# Patient Record
Sex: MALE | URBAN - METROPOLITAN AREA
[De-identification: names, ages, dates, MRNs, and addresses within clinical notes are randomized per-mention and may not be internally consistent; named-entity substitution may affect disease eponyms.]

---

## 2023-03-10 ENCOUNTER — HOSPITAL ENCOUNTER (OUTPATIENT)
Facility: MEDICAL CENTER | Age: 58
End: 2023-03-10
Attending: STUDENT IN AN ORGANIZED HEALTH CARE EDUCATION/TRAINING PROGRAM
Payer: COMMERCIAL

## 2023-03-10 LAB
APPEARANCE UR: CLEAR
BILIRUB UR QL STRIP.AUTO: NEGATIVE
COLOR UR: YELLOW
GLUCOSE UR STRIP.AUTO-MCNC: NEGATIVE MG/DL
KETONES UR STRIP.AUTO-MCNC: NEGATIVE MG/DL
LEUKOCYTE ESTERASE UR QL STRIP.AUTO: NEGATIVE
MICRO URNS: NORMAL
NITRITE UR QL STRIP.AUTO: NEGATIVE
PH UR STRIP.AUTO: 6 [PH] (ref 5–8)
PROT UR QL STRIP: NEGATIVE MG/DL
RBC UR QL AUTO: NEGATIVE
SP GR UR STRIP.AUTO: 1.02
UROBILINOGEN UR STRIP.AUTO-MCNC: 0.2 MG/DL

## 2023-03-10 PROCEDURE — 81003 URINALYSIS AUTO W/O SCOPE: CPT

## 2024-12-10 PROBLEM — M17.12 OSTEOARTHRITIS OF LEFT KNEE: Status: ACTIVE | Noted: 2024-12-10

## 2025-01-13 ENCOUNTER — APPOINTMENT (OUTPATIENT)
Dept: ADMISSIONS | Facility: MEDICAL CENTER | Age: 60
End: 2025-01-13
Attending: ORTHOPAEDIC SURGERY
Payer: COMMERCIAL

## 2025-01-17 ENCOUNTER — PRE-ADMISSION TESTING (OUTPATIENT)
Dept: ADMISSIONS | Facility: MEDICAL CENTER | Age: 60
End: 2025-01-17
Attending: ORTHOPAEDIC SURGERY
Payer: COMMERCIAL

## 2025-01-17 VITALS — HEIGHT: 74 IN | WEIGHT: 258.16 LBS | BODY MASS INDEX: 33.13 KG/M2

## 2025-01-17 DIAGNOSIS — Z01.810 PRE-OPERATIVE CARDIOVASCULAR EXAMINATION: ICD-10-CM

## 2025-01-17 DIAGNOSIS — Z01.812 PRE-OPERATIVE LABORATORY EXAMINATION: ICD-10-CM

## 2025-01-17 LAB
ALBUMIN SERPL BCP-MCNC: 4.5 G/DL (ref 3.2–4.9)
ALBUMIN/GLOB SERPL: 1.6 G/DL
ALP SERPL-CCNC: 72 U/L (ref 30–99)
ALT SERPL-CCNC: 37 U/L (ref 2–50)
ANION GAP SERPL CALC-SCNC: 14 MMOL/L (ref 7–16)
AST SERPL-CCNC: 28 U/L (ref 12–45)
BILIRUB SERPL-MCNC: 0.4 MG/DL (ref 0.1–1.5)
BUN SERPL-MCNC: 13 MG/DL (ref 8–22)
CALCIUM ALBUM COR SERPL-MCNC: 8.8 MG/DL (ref 8.5–10.5)
CALCIUM SERPL-MCNC: 9.2 MG/DL (ref 8.4–10.2)
CHLORIDE SERPL-SCNC: 101 MMOL/L (ref 96–112)
CO2 SERPL-SCNC: 22 MMOL/L (ref 20–33)
CREAT SERPL-MCNC: 0.76 MG/DL (ref 0.5–1.4)
EKG IMPRESSION: NORMAL
GFR SERPLBLD CREATININE-BSD FMLA CKD-EPI: 104 ML/MIN/1.73 M 2
GLOBULIN SER CALC-MCNC: 2.9 G/DL (ref 1.9–3.5)
GLUCOSE SERPL-MCNC: 116 MG/DL (ref 65–99)
POTASSIUM SERPL-SCNC: 4.5 MMOL/L (ref 3.6–5.5)
PROT SERPL-MCNC: 7.4 G/DL (ref 6–8.2)
SODIUM SERPL-SCNC: 137 MMOL/L (ref 135–145)

## 2025-01-17 PROCEDURE — 93010 ELECTROCARDIOGRAM REPORT: CPT | Performed by: INTERNAL MEDICINE

## 2025-01-17 PROCEDURE — 80053 COMPREHEN METABOLIC PANEL: CPT

## 2025-01-17 PROCEDURE — 93005 ELECTROCARDIOGRAM TRACING: CPT | Mod: TC

## 2025-01-17 PROCEDURE — 36415 COLL VENOUS BLD VENIPUNCTURE: CPT

## 2025-01-17 RX ORDER — ATORVASTATIN CALCIUM 80 MG/1
80 TABLET, FILM COATED ORAL EVERY EVENING
COMMUNITY
Start: 2024-10-23

## 2025-01-17 RX ORDER — OMEPRAZOLE 20 MG/1
20 CAPSULE, DELAYED RELEASE ORAL DAILY
COMMUNITY

## 2025-01-17 RX ORDER — LISINOPRIL 40 MG/1
40 TABLET ORAL DAILY
COMMUNITY
Start: 2024-12-31

## 2025-01-17 RX ORDER — HYDROCHLOROTHIAZIDE 25 MG/1
25 TABLET ORAL DAILY
COMMUNITY
Start: 2024-12-31

## 2025-01-17 RX ORDER — ACETAMINOPHEN 500 MG
500-1000 TABLET ORAL EVERY 6 HOURS PRN
COMMUNITY

## 2025-01-17 RX ORDER — AMLODIPINE BESYLATE 10 MG/1
10 TABLET ORAL DAILY
COMMUNITY
Start: 2024-12-31

## 2025-01-17 NOTE — PREADMIT AVS NOTE
Current Medications   Medication Instructions    amLODIPine (NORVASC) 10 MG Tab Continue taking medication as prescribed, including morning of procedure     atorvastatin (LIPITOR) 80 MG tablet Continue    lisinopril (PRINIVIL) 40 MG tablet Stop 24 hours before surgery    hydroCHLOROthiazide 25 MG Tab Hold medication day of procedure    acetaminophen (TYLENOL) 500 MG Tab As needed medication, may take if needed, including morning of procedure     omeprazole (PRILOSEC) 20 MG delayed-release capsule Continue taking medication as prescribed, including morning of procedure     Multiple Vitamins-Minerals (CENTRUM SILVER 50+MEN PO) Stop 7 days before surgery    Glucosamine HCl (GLUCOSAMINE PO) Stop 7 days before surgery

## 2025-01-17 NOTE — DISCHARGE PLANNING
DISCHARGE PLANNING NOTE - TOTAL JOINT    Procedure: Procedure(s):  LEFT TOTAL KNEE ARTHROPLASTY  Procedure Date: 1/28/2025  Insurance: Payor: [employer] / Plan: [employer]BS / Product Type: *No Product type* /    Equipment currently available at home?   Ice packs  Steps into the home? 2  Steps within the home? 0  Toilet height? ADA  Type of shower? walk-in shower  Home Oxygen? No  Portable tank?    Oxygen Provider:  Planning same day discharge: Yes    Is Outpatient Physical Therapy set up after surgery? Yes  Did you take the Total Joint Class and where? Yes, received [facility]ON book.  Who will be your transportation home on day of discharge? Savannah Contreras Jr    Have you made arrangements to have someone stay with you at home for the first 3 days following discharge, and if so, whom? Wife & family    Have you notified your surgeon that you do not have transportation or someone to help you after discharge? N/A    Are you planning on going to a transitional care facility, for example a skilled nursing facility, post operatively for rehab, and if so, have you contacted your insurance plan to see if they cover this? N/A      Met with the pt and wife. Pt given a copy of Home Safety Checklist, Equipment Resource Guide, CHG scrub kit and instructions. Expected process in Recovery Room and dc criteria discussed with pt. All questions answered and verbalizes understanding of all instructions. No dc needs identified at this time. Anticipate dc to home without barriers. MRSA nasal swab already done per Nydia SOTO. Pt's wife states it was negative.

## 2025-01-17 NOTE — PREPROCEDURE INSTRUCTIONS
PreAdmit Appointment: Reviewed the Preparing for your procedure handout with patient. Patient instructed per pharmacy guidelines regarding taking, holding or contacting provider for instructions on regularly prescribed medications before surgery. Instructed to take the following medications the day of surgery with a sip of water per pharmacy medication guidelines: Norvasc, Omeprazole, Tylenol as needed.    Confirmed with patient where to check in morning of surgery. AVS sent to patient's My Chart.

## 2025-01-28 ENCOUNTER — ANESTHESIA EVENT (OUTPATIENT)
Dept: SURGERY | Facility: MEDICAL CENTER | Age: 60
End: 2025-01-28
Payer: COMMERCIAL

## 2025-01-28 ENCOUNTER — PHARMACY VISIT (OUTPATIENT)
Dept: PHARMACY | Facility: MEDICAL CENTER | Age: 60
End: 2025-01-28
Payer: COMMERCIAL

## 2025-01-28 ENCOUNTER — HOSPITAL ENCOUNTER (OUTPATIENT)
Facility: MEDICAL CENTER | Age: 60
End: 2025-01-28
Attending: ORTHOPAEDIC SURGERY | Admitting: ORTHOPAEDIC SURGERY
Payer: COMMERCIAL

## 2025-01-28 ENCOUNTER — ANESTHESIA (OUTPATIENT)
Dept: SURGERY | Facility: MEDICAL CENTER | Age: 60
End: 2025-01-28
Payer: COMMERCIAL

## 2025-01-28 VITALS
HEART RATE: 105 BPM | SYSTOLIC BLOOD PRESSURE: 161 MMHG | OXYGEN SATURATION: 94 % | BODY MASS INDEX: 34.88 KG/M2 | RESPIRATION RATE: 18 BRPM | TEMPERATURE: 97.7 F | DIASTOLIC BLOOD PRESSURE: 99 MMHG | HEIGHT: 72 IN | WEIGHT: 257.5 LBS

## 2025-01-28 DIAGNOSIS — Z96.652 STATUS POST LEFT KNEE REPLACEMENT: ICD-10-CM

## 2025-01-28 DIAGNOSIS — M17.12 PRIMARY OSTEOARTHRITIS OF LEFT KNEE: ICD-10-CM

## 2025-01-28 PROCEDURE — 160029 HCHG SURGERY MINUTES - 1ST 30 MINS LEVEL 4: Performed by: ORTHOPAEDIC SURGERY

## 2025-01-28 PROCEDURE — 160041 HCHG SURGERY MINUTES - EA ADDL 1 MIN LEVEL 4: Performed by: ORTHOPAEDIC SURGERY

## 2025-01-28 PROCEDURE — 700101 HCHG RX REV CODE 250: Performed by: ORTHOPAEDIC SURGERY

## 2025-01-28 PROCEDURE — 502000 HCHG MISC OR IMPLANTS RC 0278: Performed by: ORTHOPAEDIC SURGERY

## 2025-01-28 PROCEDURE — 160047 HCHG PACU  - EA ADDL 30 MINS PHASE II: Performed by: ORTHOPAEDIC SURGERY

## 2025-01-28 PROCEDURE — 160009 HCHG ANES TIME/MIN: Performed by: ORTHOPAEDIC SURGERY

## 2025-01-28 PROCEDURE — 27447 TOTAL KNEE ARTHROPLASTY: CPT | Mod: LT | Performed by: ORTHOPAEDIC SURGERY

## 2025-01-28 PROCEDURE — C1713 ANCHOR/SCREW BN/BN,TIS/BN: HCPCS | Performed by: ORTHOPAEDIC SURGERY

## 2025-01-28 PROCEDURE — 160048 HCHG OR STATISTICAL LEVEL 1-5: Performed by: ORTHOPAEDIC SURGERY

## 2025-01-28 PROCEDURE — C1776 JOINT DEVICE (IMPLANTABLE): HCPCS | Performed by: ORTHOPAEDIC SURGERY

## 2025-01-28 PROCEDURE — A9270 NON-COVERED ITEM OR SERVICE: HCPCS | Performed by: ANESTHESIOLOGY

## 2025-01-28 PROCEDURE — 700111 HCHG RX REV CODE 636 W/ 250 OVERRIDE (IP): Mod: JZ | Performed by: ANESTHESIOLOGY

## 2025-01-28 PROCEDURE — 160002 HCHG RECOVERY MINUTES (STAT): Performed by: ORTHOPAEDIC SURGERY

## 2025-01-28 PROCEDURE — 160046 HCHG PACU - 1ST 60 MINS PHASE II: Performed by: ORTHOPAEDIC SURGERY

## 2025-01-28 PROCEDURE — 700105 HCHG RX REV CODE 258: Performed by: ANESTHESIOLOGY

## 2025-01-28 PROCEDURE — 64447 NJX AA&/STRD FEMORAL NRV IMG: CPT | Performed by: ORTHOPAEDIC SURGERY

## 2025-01-28 PROCEDURE — 700111 HCHG RX REV CODE 636 W/ 250 OVERRIDE (IP): Performed by: ORTHOPAEDIC SURGERY

## 2025-01-28 PROCEDURE — 160025 RECOVERY II MINUTES (STATS): Performed by: ORTHOPAEDIC SURGERY

## 2025-01-28 PROCEDURE — 97161 PT EVAL LOW COMPLEX 20 MIN: CPT

## 2025-01-28 PROCEDURE — 160035 HCHG PACU - 1ST 60 MINS PHASE I: Performed by: ORTHOPAEDIC SURGERY

## 2025-01-28 PROCEDURE — 97110 THERAPEUTIC EXERCISES: CPT

## 2025-01-28 PROCEDURE — 160036 HCHG PACU - EA ADDL 30 MINS PHASE I: Performed by: ORTHOPAEDIC SURGERY

## 2025-01-28 PROCEDURE — 27447 TOTAL KNEE ARTHROPLASTY: CPT | Mod: ASROC,LT | Performed by: PHYSICIAN ASSISTANT

## 2025-01-28 PROCEDURE — RXMED WILLOW AMBULATORY MEDICATION CHARGE: Performed by: PHYSICIAN ASSISTANT

## 2025-01-28 PROCEDURE — 700102 HCHG RX REV CODE 250 W/ 637 OVERRIDE(OP): Performed by: ANESTHESIOLOGY

## 2025-01-28 PROCEDURE — 700101 HCHG RX REV CODE 250: Performed by: ANESTHESIOLOGY

## 2025-01-28 DEVICE — CEMENT BONE RALLY 40 GM RALLY: Type: IMPLANTABLE DEVICE | Site: KNEE | Status: FUNCTIONAL

## 2025-01-28 DEVICE — IMPLANTABLE DEVICE: Type: IMPLANTABLE DEVICE | Site: KNEE | Status: FUNCTIONAL

## 2025-01-28 RX ORDER — OXYCODONE HYDROCHLORIDE 5 MG/1
5 TABLET ORAL EVERY 4 HOURS PRN
Qty: 42 TABLET | Refills: 0 | Status: SHIPPED | OUTPATIENT
Start: 2025-01-28 | End: 2025-02-04

## 2025-01-28 RX ORDER — DEXAMETHASONE SODIUM PHOSPHATE 4 MG/ML
INJECTION, SOLUTION INTRA-ARTICULAR; INTRALESIONAL; INTRAMUSCULAR; INTRAVENOUS; SOFT TISSUE PRN
Status: DISCONTINUED | OUTPATIENT
Start: 2025-01-28 | End: 2025-01-28 | Stop reason: SURG

## 2025-01-28 RX ORDER — ATORVASTATIN CALCIUM 40 MG/1
80 TABLET, FILM COATED ORAL EVERY EVENING
Status: CANCELLED | OUTPATIENT
Start: 2025-01-28

## 2025-01-28 RX ORDER — AMOXICILLIN 250 MG
1 CAPSULE ORAL
Status: CANCELLED | OUTPATIENT
Start: 2025-01-28

## 2025-01-28 RX ORDER — TRANEXAMIC ACID 100 MG/ML
2000 INJECTION, SOLUTION INTRAVENOUS ONCE
Status: COMPLETED | OUTPATIENT
Start: 2025-01-28 | End: 2025-01-28

## 2025-01-28 RX ORDER — POLYETHYLENE GLYCOL 3350 17 G/17G
1 POWDER, FOR SOLUTION ORAL 2 TIMES DAILY PRN
Status: CANCELLED | OUTPATIENT
Start: 2025-01-28

## 2025-01-28 RX ORDER — AMLODIPINE BESYLATE 5 MG/1
10 TABLET ORAL DAILY
Status: CANCELLED | OUTPATIENT
Start: 2025-01-29

## 2025-01-28 RX ORDER — VANCOMYCIN 1.5 G/300ML
1500 INJECTION, SOLUTION INTRAVENOUS
Status: COMPLETED | OUTPATIENT
Start: 2025-01-28 | End: 2025-01-28

## 2025-01-28 RX ORDER — HYDROMORPHONE HYDROCHLORIDE 1 MG/ML
0.1 INJECTION, SOLUTION INTRAMUSCULAR; INTRAVENOUS; SUBCUTANEOUS
Status: DISCONTINUED | OUTPATIENT
Start: 2025-01-28 | End: 2025-01-28 | Stop reason: HOSPADM

## 2025-01-28 RX ORDER — MIDAZOLAM HYDROCHLORIDE 1 MG/ML
INJECTION INTRAMUSCULAR; INTRAVENOUS PRN
Status: DISCONTINUED | OUTPATIENT
Start: 2025-01-28 | End: 2025-01-28 | Stop reason: SURG

## 2025-01-28 RX ORDER — EPHEDRINE SULFATE 50 MG/ML
5 INJECTION, SOLUTION INTRAVENOUS
Status: DISCONTINUED | OUTPATIENT
Start: 2025-01-28 | End: 2025-01-28 | Stop reason: HOSPADM

## 2025-01-28 RX ORDER — DOCUSATE SODIUM 100 MG/1
100 CAPSULE, LIQUID FILLED ORAL 2 TIMES DAILY
Status: CANCELLED | OUTPATIENT
Start: 2025-01-28

## 2025-01-28 RX ORDER — HYDROCHLOROTHIAZIDE 25 MG/1
25 TABLET ORAL DAILY
Status: CANCELLED | OUTPATIENT
Start: 2025-01-28

## 2025-01-28 RX ORDER — AMOXICILLIN 250 MG
1 CAPSULE ORAL NIGHTLY
Status: CANCELLED | OUTPATIENT
Start: 2025-01-28

## 2025-01-28 RX ORDER — OXYCODONE HCL 5 MG/5 ML
10 SOLUTION, ORAL ORAL
Status: COMPLETED | OUTPATIENT
Start: 2025-01-28 | End: 2025-01-28

## 2025-01-28 RX ORDER — LIDOCAINE HYDROCHLORIDE 20 MG/ML
INJECTION, SOLUTION EPIDURAL; INFILTRATION; INTRACAUDAL; PERINEURAL PRN
Status: DISCONTINUED | OUTPATIENT
Start: 2025-01-28 | End: 2025-01-28 | Stop reason: SURG

## 2025-01-28 RX ORDER — KETOROLAC TROMETHAMINE 30 MG/ML
INJECTION, SOLUTION INTRAMUSCULAR; INTRAVENOUS
Status: DISCONTINUED | OUTPATIENT
Start: 2025-01-28 | End: 2025-01-28 | Stop reason: HOSPADM

## 2025-01-28 RX ORDER — OXYCODONE HYDROCHLORIDE 10 MG/1
10 TABLET ORAL
Status: CANCELLED | OUTPATIENT
Start: 2025-01-28

## 2025-01-28 RX ORDER — HYDRALAZINE HYDROCHLORIDE 20 MG/ML
5 INJECTION INTRAMUSCULAR; INTRAVENOUS
Status: DISCONTINUED | OUTPATIENT
Start: 2025-01-28 | End: 2025-01-28 | Stop reason: HOSPADM

## 2025-01-28 RX ORDER — HYDROMORPHONE HYDROCHLORIDE 1 MG/ML
0.4 INJECTION, SOLUTION INTRAMUSCULAR; INTRAVENOUS; SUBCUTANEOUS
Status: DISCONTINUED | OUTPATIENT
Start: 2025-01-28 | End: 2025-01-28 | Stop reason: HOSPADM

## 2025-01-28 RX ORDER — ROCURONIUM BROMIDE 10 MG/ML
INJECTION, SOLUTION INTRAVENOUS PRN
Status: DISCONTINUED | OUTPATIENT
Start: 2025-01-28 | End: 2025-01-28 | Stop reason: SURG

## 2025-01-28 RX ORDER — KETOROLAC TROMETHAMINE 15 MG/ML
15 INJECTION, SOLUTION INTRAMUSCULAR; INTRAVENOUS EVERY 6 HOURS
Status: CANCELLED | OUTPATIENT
Start: 2025-01-28 | End: 2025-01-31

## 2025-01-28 RX ORDER — SODIUM CHLORIDE 9 MG/ML
INJECTION, SOLUTION INTRAVENOUS CONTINUOUS
Status: CANCELLED | OUTPATIENT
Start: 2025-01-28 | End: 2025-01-28

## 2025-01-28 RX ORDER — HYDROMORPHONE HYDROCHLORIDE 1 MG/ML
0.2 INJECTION, SOLUTION INTRAMUSCULAR; INTRAVENOUS; SUBCUTANEOUS
Status: DISCONTINUED | OUTPATIENT
Start: 2025-01-28 | End: 2025-01-28 | Stop reason: HOSPADM

## 2025-01-28 RX ORDER — ALBUTEROL SULFATE 5 MG/ML
2.5 SOLUTION RESPIRATORY (INHALATION)
Status: DISCONTINUED | OUTPATIENT
Start: 2025-01-28 | End: 2025-01-28 | Stop reason: HOSPADM

## 2025-01-28 RX ORDER — OXYCODONE HYDROCHLORIDE 5 MG/1
5 TABLET ORAL
Status: CANCELLED | OUTPATIENT
Start: 2025-01-28

## 2025-01-28 RX ORDER — ASPIRIN 81 MG/1
81 TABLET ORAL 2 TIMES DAILY
Status: CANCELLED | OUTPATIENT
Start: 2025-01-29

## 2025-01-28 RX ORDER — SCOPOLAMINE 1 MG/3D
1 PATCH, EXTENDED RELEASE TRANSDERMAL
Status: CANCELLED | OUTPATIENT
Start: 2025-01-28

## 2025-01-28 RX ORDER — DEXAMETHASONE SODIUM PHOSPHATE 4 MG/ML
4 INJECTION, SOLUTION INTRA-ARTICULAR; INTRALESIONAL; INTRAMUSCULAR; INTRAVENOUS; SOFT TISSUE
Status: CANCELLED | OUTPATIENT
Start: 2025-01-28

## 2025-01-28 RX ORDER — CEFAZOLIN SODIUM 1 G/3ML
2 INJECTION, POWDER, FOR SOLUTION INTRAMUSCULAR; INTRAVENOUS ONCE
Status: COMPLETED | OUTPATIENT
Start: 2025-01-28 | End: 2025-01-28

## 2025-01-28 RX ORDER — BUPIVACAINE HYDROCHLORIDE AND EPINEPHRINE 2.5; 5 MG/ML; UG/ML
INJECTION, SOLUTION EPIDURAL; INFILTRATION; INTRACAUDAL; PERINEURAL
Status: DISCONTINUED | OUTPATIENT
Start: 2025-01-28 | End: 2025-01-28 | Stop reason: HOSPADM

## 2025-01-28 RX ORDER — DIPHENHYDRAMINE HYDROCHLORIDE 50 MG/ML
12.5 INJECTION INTRAMUSCULAR; INTRAVENOUS
Status: DISCONTINUED | OUTPATIENT
Start: 2025-01-28 | End: 2025-01-28 | Stop reason: HOSPADM

## 2025-01-28 RX ORDER — MEPERIDINE HYDROCHLORIDE 25 MG/ML
6.25 INJECTION INTRAMUSCULAR; INTRAVENOUS; SUBCUTANEOUS
Status: DISCONTINUED | OUTPATIENT
Start: 2025-01-28 | End: 2025-01-28 | Stop reason: HOSPADM

## 2025-01-28 RX ORDER — LISINOPRIL 40 MG/1
40 TABLET ORAL DAILY
Status: CANCELLED | OUTPATIENT
Start: 2025-01-28

## 2025-01-28 RX ORDER — HYDROMORPHONE HYDROCHLORIDE 2 MG/ML
INJECTION, SOLUTION INTRAMUSCULAR; INTRAVENOUS; SUBCUTANEOUS PRN
Status: DISCONTINUED | OUTPATIENT
Start: 2025-01-28 | End: 2025-01-28 | Stop reason: SURG

## 2025-01-28 RX ORDER — ACETAMINOPHEN 500 MG
1000 TABLET ORAL ONCE
Status: COMPLETED | OUTPATIENT
Start: 2025-01-28 | End: 2025-01-28

## 2025-01-28 RX ORDER — TRAMADOL HYDROCHLORIDE 50 MG/1
50 TABLET ORAL EVERY 4 HOURS PRN
Status: CANCELLED | OUTPATIENT
Start: 2025-01-28

## 2025-01-28 RX ORDER — ONDANSETRON 2 MG/ML
4 INJECTION INTRAMUSCULAR; INTRAVENOUS
Status: DISCONTINUED | OUTPATIENT
Start: 2025-01-28 | End: 2025-01-28 | Stop reason: HOSPADM

## 2025-01-28 RX ORDER — HALOPERIDOL 5 MG/ML
1 INJECTION INTRAMUSCULAR
Status: DISCONTINUED | OUTPATIENT
Start: 2025-01-28 | End: 2025-01-28 | Stop reason: HOSPADM

## 2025-01-28 RX ORDER — SODIUM CHLORIDE, SODIUM LACTATE, POTASSIUM CHLORIDE, CALCIUM CHLORIDE 600; 310; 30; 20 MG/100ML; MG/100ML; MG/100ML; MG/100ML
INJECTION, SOLUTION INTRAVENOUS CONTINUOUS
Status: DISCONTINUED | OUTPATIENT
Start: 2025-01-28 | End: 2025-01-28 | Stop reason: HOSPADM

## 2025-01-28 RX ORDER — OXYCODONE HCL 5 MG/5 ML
5 SOLUTION, ORAL ORAL
Status: COMPLETED | OUTPATIENT
Start: 2025-01-28 | End: 2025-01-28

## 2025-01-28 RX ORDER — METOPROLOL TARTRATE 1 MG/ML
1 INJECTION, SOLUTION INTRAVENOUS
Status: DISCONTINUED | OUTPATIENT
Start: 2025-01-28 | End: 2025-01-28 | Stop reason: HOSPADM

## 2025-01-28 RX ORDER — ONDANSETRON 2 MG/ML
4 INJECTION INTRAMUSCULAR; INTRAVENOUS EVERY 4 HOURS PRN
Status: CANCELLED | OUTPATIENT
Start: 2025-01-28

## 2025-01-28 RX ORDER — HYDROMORPHONE HYDROCHLORIDE 1 MG/ML
0.5 INJECTION, SOLUTION INTRAMUSCULAR; INTRAVENOUS; SUBCUTANEOUS
Status: CANCELLED | OUTPATIENT
Start: 2025-01-28

## 2025-01-28 RX ORDER — ASPIRIN 81 MG/1
81 TABLET, CHEWABLE ORAL 2 TIMES DAILY WITH MEALS
Qty: 84 TABLET | Refills: 0 | Status: SHIPPED | OUTPATIENT
Start: 2025-01-28

## 2025-01-28 RX ORDER — BISACODYL 10 MG
10 SUPPOSITORY, RECTAL RECTAL
Status: CANCELLED | OUTPATIENT
Start: 2025-01-28

## 2025-01-28 RX ORDER — HALOPERIDOL 5 MG/ML
1 INJECTION INTRAMUSCULAR EVERY 6 HOURS PRN
Status: CANCELLED | OUTPATIENT
Start: 2025-01-28

## 2025-01-28 RX ORDER — OMEPRAZOLE 20 MG/1
20 CAPSULE, DELAYED RELEASE ORAL DAILY
Status: CANCELLED | OUTPATIENT
Start: 2025-01-29

## 2025-01-28 RX ORDER — DIPHENHYDRAMINE HYDROCHLORIDE 50 MG/ML
25 INJECTION INTRAMUSCULAR; INTRAVENOUS EVERY 6 HOURS PRN
Status: CANCELLED | OUTPATIENT
Start: 2025-01-28

## 2025-01-28 RX ORDER — IBUPROFEN 400 MG/1
800 TABLET, FILM COATED ORAL 3 TIMES DAILY PRN
Status: CANCELLED | OUTPATIENT
Start: 2025-01-31

## 2025-01-28 RX ORDER — SODIUM CHLORIDE, SODIUM LACTATE, POTASSIUM CHLORIDE, CALCIUM CHLORIDE 600; 310; 30; 20 MG/100ML; MG/100ML; MG/100ML; MG/100ML
INJECTION, SOLUTION INTRAVENOUS
Status: DISCONTINUED | OUTPATIENT
Start: 2025-01-28 | End: 2025-01-28 | Stop reason: SURG

## 2025-01-28 RX ORDER — BUPIVACAINE HYDROCHLORIDE 5 MG/ML
INJECTION, SOLUTION EPIDURAL; INTRACAUDAL PRN
Status: DISCONTINUED | OUTPATIENT
Start: 2025-01-28 | End: 2025-01-28 | Stop reason: SURG

## 2025-01-28 RX ADMIN — DEXAMETHASONE SODIUM PHOSPHATE 8 MG: 4 INJECTION INTRA-ARTICULAR; INTRALESIONAL; INTRAMUSCULAR; INTRAVENOUS; SOFT TISSUE at 12:33

## 2025-01-28 RX ADMIN — TRANEXAMIC ACID 1000 MG: 100 INJECTION, SOLUTION INTRAVENOUS at 12:52

## 2025-01-28 RX ADMIN — SUGAMMADEX 200 MG: 100 INJECTION, SOLUTION INTRAVENOUS at 12:59

## 2025-01-28 RX ADMIN — BUPIVACAINE HYDROCHLORIDE 20 ML: 5 INJECTION, SOLUTION EPIDURAL; INTRACAUDAL at 11:40

## 2025-01-28 RX ADMIN — HYDROMORPHONE HYDROCHLORIDE 0.5 MG: 2 INJECTION INTRAMUSCULAR; INTRAVENOUS; SUBCUTANEOUS at 12:30

## 2025-01-28 RX ADMIN — HYDROMORPHONE HYDROCHLORIDE 1 MG: 2 INJECTION INTRAMUSCULAR; INTRAVENOUS; SUBCUTANEOUS at 12:16

## 2025-01-28 RX ADMIN — PROPOFOL 200 MG: 10 INJECTION, EMULSION INTRAVENOUS at 11:58

## 2025-01-28 RX ADMIN — VANCOMYCIN 1500 MG: 1.5 INJECTION, SOLUTION INTRAVENOUS at 10:54

## 2025-01-28 RX ADMIN — FENTANYL CITRATE 50 MCG: 50 INJECTION, SOLUTION INTRAMUSCULAR; INTRAVENOUS at 12:12

## 2025-01-28 RX ADMIN — MIDAZOLAM HYDROCHLORIDE 2 MG: 1 INJECTION, SOLUTION INTRAMUSCULAR; INTRAVENOUS at 11:36

## 2025-01-28 RX ADMIN — ROCURONIUM BROMIDE 65 MG: 50 INJECTION, SOLUTION INTRAVENOUS at 11:58

## 2025-01-28 RX ADMIN — LIDOCAINE HYDROCHLORIDE 60 MG: 20 INJECTION, SOLUTION EPIDURAL; INFILTRATION; INTRACAUDAL; PERINEURAL at 11:58

## 2025-01-28 RX ADMIN — TRANEXAMIC ACID 1000 MG: 100 INJECTION, SOLUTION INTRAVENOUS at 12:14

## 2025-01-28 RX ADMIN — HYDROMORPHONE HYDROCHLORIDE 0.5 MG: 2 INJECTION INTRAMUSCULAR; INTRAVENOUS; SUBCUTANEOUS at 12:55

## 2025-01-28 RX ADMIN — OXYCODONE HYDROCHLORIDE 10 MG: 5 SOLUTION ORAL at 13:18

## 2025-01-28 RX ADMIN — FENTANYL CITRATE 50 MCG: 50 INJECTION, SOLUTION INTRAMUSCULAR; INTRAVENOUS at 13:18

## 2025-01-28 RX ADMIN — ROCURONIUM BROMIDE 5 MG: 50 INJECTION, SOLUTION INTRAVENOUS at 12:37

## 2025-01-28 RX ADMIN — ACETAMINOPHEN 1000 MG: 500 TABLET ORAL at 10:54

## 2025-01-28 RX ADMIN — CEFAZOLIN 2 G: 1 INJECTION, POWDER, FOR SOLUTION INTRAMUSCULAR; INTRAVENOUS at 12:09

## 2025-01-28 RX ADMIN — SODIUM CHLORIDE, POTASSIUM CHLORIDE, SODIUM LACTATE AND CALCIUM CHLORIDE: 600; 310; 30; 20 INJECTION, SOLUTION INTRAVENOUS at 11:53

## 2025-01-28 RX ADMIN — FENTANYL CITRATE 50 MCG: 50 INJECTION, SOLUTION INTRAMUSCULAR; INTRAVENOUS at 11:58

## 2025-01-28 ASSESSMENT — COGNITIVE AND FUNCTIONAL STATUS - GENERAL
MOVING FROM LYING ON BACK TO SITTING ON SIDE OF FLAT BED: A LITTLE
WALKING IN HOSPITAL ROOM: A LITTLE
CLIMB 3 TO 5 STEPS WITH RAILING: A LITTLE
MOBILITY SCORE: 18
SUGGESTED CMS G CODE MODIFIER MOBILITY: CK
MOVING TO AND FROM BED TO CHAIR: A LITTLE
TURNING FROM BACK TO SIDE WHILE IN FLAT BAD: A LITTLE
STANDING UP FROM CHAIR USING ARMS: A LITTLE

## 2025-01-28 ASSESSMENT — PAIN DESCRIPTION - PAIN TYPE
TYPE: SURGICAL PAIN
TYPE: CHRONIC PAIN
TYPE: SURGICAL PAIN
TYPE: SURGICAL PAIN

## 2025-01-28 ASSESSMENT — GAIT ASSESSMENTS
DEVIATION: DECREASED HEEL STRIKE;DECREASED TOE OFF
ASSISTIVE DEVICE: FRONT WHEEL WALKER
GAIT LEVEL OF ASSIST: STANDBY ASSIST
DISTANCE (FEET): 75

## 2025-01-28 NOTE — H&P
Surgery Orthopedic History & Physical Note    Date  1/28/2025    Primary Care Physician  Obie Hernandez M.D.    CC  Pre-Op Diagnosis Codes:      * Osteoarthritis of left knee [M17.12]    HPI  This is a 59 y.o. male who presented with left knee pain for several years    Past Medical History:   Diagnosis Date    Arthritis     Fatty liver     High cholesterol     Hypertension     Left knee pain 01/17/2025    8/10       Past Surgical History:   Procedure Laterality Date    FINGER AMPUTATION Left 2018    middle finger    COLONOSCOPY         No current facility-administered medications for this encounter.       Social History     Socioeconomic History    Marital status:      Spouse name: Not on file    Number of children: Not on file    Years of education: Not on file    Highest education level: Not on file   Occupational History    Not on file   Tobacco Use    Smoking status: Some Days     Average packs/day: 0.1 packs/day for 41.0 years (4.1 ttl pk-yrs)     Types: Cigarettes     Start date: 1/1/1984    Smokeless tobacco: Never   Vaping Use    Vaping status: Never Used   Substance and Sexual Activity    Alcohol use: Yes     Alcohol/week: 12.0 oz     Types: 20 Cans of beer per week    Drug use: Not Currently    Sexual activity: Not on file   Other Topics Concern    Not on file   Social History Narrative    Not on file     Social Drivers of Health     Financial Resource Strain: Not on file   Food Insecurity: Not on file   Transportation Needs: Not on file   Physical Activity: Not on file   Stress: Not on file   Social Connections: Not on file   Intimate Partner Violence: Not on file   Housing Stability: Not on file       No family history on file.    Allergies  Patient has no known allergies.    Review of Systems  Negative    Physical Exam    Vital Signs  Blood Pressure: (!) 142/86       Pulse: 95       Pulse Oximetry: 94 %   Walks with an antalgic gait  Moderate joint effusion left knee    Labs:                     Radiology:  No orders to display         Assessment/Plan:  Pre-Op Diagnosis Codes:      * Osteoarthritis of left knee [M17.12]  Procedure(s):  LEFT TOTAL KNEE ARTHROPLASTY

## 2025-01-28 NOTE — OR NURSING
1330 - Handoff from Sejal SOTO. Pt resting comfortably. Dressing to lt knee CDI. NAD. VSS.    1358 - Handoff to Sejal SOTO

## 2025-01-28 NOTE — DISCHARGE INSTRUCTIONS
If any questions arise, call your provider.  If your provider is not available, please feel free to call the [facility] at [phone].    MEDICATIONS: Resume taking daily medication.  Take prescribed pain medication with food.  If no medication is prescribed, you may take non-aspirin pain medication if needed.  PAIN MEDICATION CAN BE VERY CONSTIPATING.  Take a stool softener or laxative such as senokot, pericolace, or milk of magnesia if needed.    Last pain medication given at 1:18pm 10mg Oxycodone     What to Expect Post Anesthesia    Rest and take it easy for the first 24 hours.  A responsible adult is recommended to remain with you during that time.  It is normal to feel sleepy.  We encourage you to not do anything that requires balance, judgment or coordination.    FOR 24 HOURS DO NOT:  Drive, operate machinery or run household appliances.  Drink beer or alcoholic beverages.  Make important decisions or sign legal documents.    To avoid nausea, slowly advance diet as tolerated, avoiding spicy or greasy foods for the first day.  Add more substantial food to your diet according to your provider's instructions.  Babies can be fed formula or breast milk as soon as they are hungry.  INCREASE FLUIDS AND FIBER TO AVOID CONSTIPATION.    MILD FLU-LIKE SYMPTOMS ARE NORMAL.  YOU MAY EXPERIENCE GENERALIZED MUSCLE ACHES, THROAT IRRITATION, HEADACHE AND/OR SOME NAUSEA.    Activity:   The first week after your knee joint replacement is a time to recover from the surgery. We expect light exercise to keep you active and mobile.    LEFT LEG:   WEIGHT BEARING AS TOLERATED    When you stand or walk, place only as much weight as feels comfortable on your injured leg.  Let pain be your guide. If you feel pain, place less weight on the leg.     ASSISTED DEVICES: You are being discharged with the following special equipment:  Walker    Dressing and Wound Care:    Keep the dressing clean and dry. Leave the dressing in place  until follow-up.    Shower / Bathing:    OK to shower, keep dressing in place. Pat dressing dry, do not rub the incision.  Swimming pools, hot tubs, or baths are to be avoided until after your follow-up and then only if cleared by your surgeon.      Apply Ice Pack to Knee:   Apply ice packs to your knee (15 minutes on the knee, 15 minutes off the knee) for the first week, as you feel necessary to help with the pain and swelling.    SWELLING/BRUISING  Swelling is an expected response to surgery. To reduce swelling, elevate your surgical limb above heart level multiple times per day and apply ice (see Ice instructions). If your swelling becomes excessive, is limiting your ability to move, or becomes worrisome please contact your doctor's office.    Bruising often does not appear until after you arrive home and can be quite dramatic-appearing purple, black, or green. Bruising is typically not concerning and will subside without any treatment.     FOLLOW-UP  Make sure that you have an appointment 7-14 days following surgery. Your procedure/rehabilitation will be discussed and physical therapy may be prescribed at that time.      Instrucciones Para [place]  (Home Care Instructions)    ACTIVIDAD: Descanse y tome todo con mucha calma las primeras 24 horas después de harden cirugía.  Zara persona adulta responsable debe permanecer con usted lia bijan periodo de tiempo.  Es normal sentirse sonoliento o sonolienta lia esas primeras horas.  Le recomendamos que no rosetta nada que requiera equilibrio, marcelina decisiones a mucha coordinación de harden parte.    NO ROSETTA ESTO PURANTE LAS PRIMERAS 24 HORAS:   Manejar o conducir algún vehiculo, operar maquinarias o utilizar electrodomesticos.   Beber cerveza o algún otro tipo de bebida alcohólica.   Marcelina decisiones importantes o firmar documentos legales.    INSTRUCCIONES ESPECIALES: or     GUÍA DE CUIDADOS POSTERIORES AL REEMPLAZO TOTAL DE RODILLA   TOTAL KNEE REPLACEMENT, AFTER-CARE  GUIDELINES     Estas instrucciones le brindan información acerca de cómo debe cuidarse y cuidar harden rodilla después de la cirugía. También es posible que harden proveedor de atención médica le dé instrucciones más específicas. Harden tratamiento se planificó y se llevó a cabo de acuerdo con las prácticas médicas actuales; sin embargo, a veces se presentan problemas. Si tiene algún problema o alguna pregunta, llame a harden proveedor de atención médica.    QUÉ DEBE ESPERAR DESPUÉS DEL PROCEDIMIENTO   Después del procedimiento, por lo general, la rodilla se volverá rígida, le generará dolor y presentará hematomas. Estos síntomas mejorarán con el tiempo.     Dolor   Siga harden plan para el manejo del dolor en el hogar según lo acordado con el personal de enfermería y según lo indicado por harden proveedor.   Es importante que cumpla con la administración programada de analgésicos para aliviar la mayor cantidad de dolor.   Si le recetan medicamentos opioides, el objetivo es utilizarlos solo cuando sea necesario y dejar de maryanne analgésicos con receta lo antes posible.   Utilice hielo para controlar el dolor.   Coloque hielo en pablo bolsa de plástico.   Coloque pablo toalla entre la piel y la bolsa.   Deje el hielo sobre la rodilla lia 20 minutos, de 2 a 3 veces por día lani mínimo.   La mayoría de los pacientes carl de maryanne analgésicos después de 3 semanas. Si el dolor que siente continúa siendo intenso, realice un seguimiento con harden proveedor.     Infección   Las infecciones en las articulaciones de la rodilla ocurren en menos del 2 % de los pacientes. Las causas más frecuentes de las infecciones después de pablo cirugía de reemplazo total de rodilla provienen de bacterias que ingresan en el torrente sanguíneo lia procedimientos dentales, infecciones de las vías urinarias o infecciones cutáneas. Estas bacterias pueden alojarse cerca del reemplazo de rodilla y pueden provocar pablo infección.   Mantenga la incisión lo más limpia y seca  posible.   Lávese siempre las aflred antes de tocar la incisión.   Evite recibir atención dental lia los 3 meses posteriores a la cirugía. Es posible que harden proveedor le recomiende maryanne pablo dosis de antibióticos pablo hora antes de un procedimiento dental. Luego de 2 años, la mayoría de los proveedores recomiendan maryanne antibióticos solo antes de un procedimiento de larga duración. Pregúntele a harden proveedor qué recomienda.   Los signos y síntomas de las infecciones incluyen fiebre baja, enrojecimiento, hinchazón y secreción de la incisión. Informe DE INMEDIATO a harden proveedor si presenta CUALQUIERA de estos síntomas.     Alteraciones posoperatorias   Hábitos intestinales: el estreñimiento es muy frecuente y surge de pablo combinación de anestesia, falta de movilidad, deshidratación y uso de analgésicos. De ser necesario, utilice ablandadores de heces o laxantes. Es importante no ignorar edward problema, ya que las obstrucciones intestinales pueden ser complicaciones graves tras pablo cirugía de reemplazo de articulaciones.   Nivel de energía/estado de ánimo: muchos pacientes sufren la falta de energía y de resistencia lia 2 a 3 meses después de la cirugía. Algunas personas se sienten decaídas e incluso pueden deprimirse. [place] probablemente se deba a la anemia posoperatoria, los cambios en el nivel de actividad, la falta de sueño, el uso de analgésicos y simplemente la reacción emocional a la cirugía, que representa pablo gran alteración en la mamadou de la persona. Por lo general, estos síntomas no son permanentes. Si los síntomas no desaparecen, realice un seguimiento con harden proveedor de atención primaria.  Regreso al trabajo: harden proveedor le dará instrucciones específicas en función de harden profesión. Por lo general, si el trabajo es sedentario y requiere estar poco tiempo de pie o caminando, la mayoría de los pacientes regresan a trabajar entre las 2 y las 6 semanas después de la cirugía. Volver a realizar trabajos  manuales que impliquen caminar, levantar objetos o estar de pie puede llevar de 3 a 4 meses. El consultorio de harden proveedor puede proporcionarle un permiso para realizar trabajos ligeros o de tiempo parcial en pablo etapa temprana de la recuperación e ir progresando hasta que pueda realizar harden trabajo normalmente, en la medida de jimmy posibilidades.   Conducción de vehículos: usted podrá comenzar a conducir cuando harden proveedor lo autorice, siempre que haya dejado de maryanne analgésicos narcóticos o cualquier otro medicamento que afecte harden capacidad para conducir. Hable con harden médico sobre el tiempo que deberá esperar, ya que volver a conducir dependerá de harden vehículo, de qué rodilla le [place] reemplazado (la derecha o la izquierda) y de si debe maryanne precauciones con respecto a los movimientos posoperatorios o no.   Evitar las caídas: pablo caída lia las primeras semanas después de la cirugía puede dañar la nueva rodilla y podría significar que se necesite realizar otra cirugía. Retire los tapetes y fije las alfombras que estén sueltas. Tenga en cuenta los riesgos que se presentan en el suelo, lani las mascotas, los objetos pequeños o las superficies irregulares. Si sufre alguna caída, infórmeselo a harden proveedor.   Detectores de metales en aeropuertos: la sensibilidad de los detectores de metales puede variar y es posible que harden prótesis dispare pablo alarma. Infórmele al oficial de seguridad sobre harden articulación artificial.     Dieta   Reanude harden dieta habitual en la medida en que pueda tolerarla.   Es importante lograr un estado nutricional saludable; para ello, debe llevar pablo dieta americo equilibrada con regularidad.   Harden proveedor puede recomendarle el consumo de suplementos vitamínicos y de asia.   Continúe bebiendo abundante cantidad de líquido.     Ducha/baño   Podrá ducharse en cuanto llegue a harden hogar del hospital, a menos que le indiquen lo contrario.   Mantenga la incisión fuera del agua para prevenir pablo  infección. Para mantener la incisión seca al ducharse, cúbrala con pablo bolsa o pablo envoltura de plástico. Si la venda es impermeable, es posible que no necesite hacerlo.   Si la incisión se humedece, séquela con golpes suaves, no la frote. Informe a harden proveedor.   No se sumerja en pablo bañera hasta que harden proveedor lo autorice. Para poder hacerlo, las grapas tienen que haberse retirado y la incisión tiene que haberse curado por completo.     Cambio de apósitos: deberá cambiarse los apósitos únicamente cuando harden proveedor se lo indique.   Lávese las alfred.   Doc todos los materiales necesarios para el cambio de apósitos.   Retire el apósito rosanne y deséchelo.   Examine la incisión por signos de irritación o infección, lani enrojecimiento, aumento de secreción transparente, secreción amarilla o tyrone, olor y piel circundante caliente al tacto. Si presenta algunos de estos signos, informe a harden proveedor.   [place] el apósito nuevo desde jennifer de jimmy extremos y colóquelo sobre la incisión. Procure no tocar el interior del apósito que colocará sobre la incisión.   Fíjelo lani se indica.     Hinchazón/hematomas   La hinchazón es normal después de un reemplazo de rodilla y puede presentarse en el muslo, la rodilla, la pantorrilla y el pie.   La hinchazón puede durar entre 3 y 6 meses.   Para reducir la hinchazón, cuando se recueste, levante la pierna a un nivel más alto que el del corazón. La primera semana que se encuentre en harden hogar, debe levantar la pierna lia la misma cantidad de tiempo que se encuentra en actividad.   Por lo general, la hinchazón empeora después de regresar a harden hogar, ya que usted está en posición vertical lia más tiempo.   Generalmente, los hematomas no aparecen hasta que usted llega a harden hogar, y pueden ser bastante impresionantes por harden color púrpura, mallika o tyrone. Normalmente, los hematomas no generan preocupación y desaparecen sin realizar ningún tipo de tratamiento.     Prevención de la  formación de coágulos de sarai   Harden plan de tratamiento incluye varias medidas preventivas para disminuir el riesgo de que se formen coágulos de sarai en las piernas (trombosis venosa profunda [deep vein thrombosis, DVT]) y coágulos que se dirijan hacia los pulmones (émbolos pulmonares), que son menos frecuentes, jerilyn graves. Si americo la mayoría de los pacientes tienen riesgo común de formar coágulos, entre las personas con mayor riesgo se encuentran las que carlisle tenido coágulos previamente o que tienen antecedentes familiares de coagulación, las fumadoras, las diabéticas, las que sufren de obesidad, las personas en edad avanzada, las que utilizan estrógenos o las que llevan un estilo de mamadou sedentario.     Algunos signos de coágulos de sarai en las piernas incluyen los siguientes: hinchazón del muslo, la pantorrilla o el tobillo que no mejora al levantar las piernas; dolor, calor y sensibilidad en la pantorrilla, la espalda, la parte posterior de la pantorrilla o el área inguinal. NOTA: Los coágulos de sarai pueden presentarse en cualquiera de las piernas.   Algunos signos de coágulos de sarai en los pulmones incluyen los siguientes: dificultad para respirar repentina y que empeora, inicio repentino de dolor de pecho y dolor de pecho localizado al toser.   Si tiene cualquiera de los síntomas anteriores, informe a harden proveedor y busque atención médica de inmediato.   Si recibió terapia anticoagulante (diluyentes de la sarai) en el hospital, continúe tomando los medicamentos anticoagulantes recetados en harden hogar, según lo indicado por harden proveedor.   El riesgo de que se formen coágulos permanece entre 2 y 3 meses después de la cirugía. Evite la deshidratación y el permanecer sentado lia mucho tiempo (viajes en avión o en auto de larga duración). Si se va de viaje lia edward tiempo, levántese, muévase cada 1 hora o 1 hora y media y analice todos los planes de viaje con harden proveedor.     Actividad   Cuando  regrese a harden casa, manténgase activo. La clave está en no excederse. Si americo puede esperar tener algunos días buenos y otros días malos, debería notar pablo mejora gradual y un aumento gradual en harden resistencia en los próximos 6 a 12 meses. El ejercicio es un componente fundamental de la recuperación, especialmente lia las primeras semanas después de la cirugía.     Actividades cotidianas: podrá reanudar la mayor parte de estas actividades en un plazo de 3 a 6 semanas después de la cirugía. Es común sentir un poco de dolor al realizar actividades y en la noche lia algunas semanas después de la cirugía. Cuando el médico lo apruebe, camine todo lo que desee, jerilyn recuerde que caminar no sustituye los ejercicios que harden médico y harden fisioterapeuta le indicaron. Utilice un andador, muletas o un bastón para ayudarle a caminar hasta que pueda hacerlo con soltura (con pablo pequeña cojera o sin cojera) y sin asistencia.   Ejercicios de la terapia física: siga harden programa de ejercicios en el hogar según lo indicado por harden fisioterapeuta lia harden estadía en el hospital. Llame para programar citas de terapia física para pacientes ambulatorios, según le recomiende harden proveedor. La terapia física tras la estadía en el hospital se centra en aumentar harden rango de movilidad, fortalecer los músculos y mejorar harden forma de andar o de caminar. Si aún no ha recibido el referido para recibir terapia física para pacientes ambulatorios, comuníquese con harden proveedor para obtenerlo.   Utilizar pablo bicicleta fija puede ayudar a conservar el carlito muscular y a mantener la flexibilidad de la rodilla. Comience a utilizar pablo bicicleta fija según lo indicado por harden fisioterapeuta o harden proveedor.  Actividad sexual: harden proveedor podrá decirle cuándo será seguro retomar la actividad sexual.   Posiciones para dormir: puede dormir boca arriba, de costado o boca abajo sin inconvenientes.   Otras actividades: se recomienda realizar actividades de  bajo impacto. Si tiene preguntas específicas, consulte a harden proveedor.     Cuándo llamar al médico   Llame a harden proveedor si presenta los siguientes síntomas:   Fiebre superior a 100.5 ºF.   Aumento del dolor, secreción, enrojecimiento, olor o calor alrededor del lugar de la incisión.   Escalofríos.   Aumento del dolor de rodilla al realizar actividades y al hacer reposo.   Aumento del dolor en la pantorrilla, sensibilidad o enrojecimiento por encima o por debajo de la rodilla.   Aumento de la hinchazón en la pantorrilla, el tobillo o el pie.   Dificultad para respirar repentina y que empeora, inicio repentino de dolor de pecho y dolor de pecho localizado al toser.   Abertura de la incisión.   O americo, llámelo si tiene alguna pregunta o inquietud sobre los medicamentos o la atención.     Recursos estadísticos sobre infecciones:   [email or web address]-[email or web address]     DIETA: Para evitar las nauseas, prosiga despacito con harden dieta a medida que pueda ir tolerándola mejor, evite comidas muy condimentadas o grasosas lia bijan primer día.  Vaya agregando comidas más substanciadas a harden dieta a medida que asi lo indique harden médica.  Los bebés pueden beber leche preparada o formula, ásl be también leche del seno de la madre a medida que vayan teniendo hambre.  SIGA AGREGANDO LIQUIDOS Y COMIDAS CON FIBRA PARA EVITAR ESTREÑIMIENTO.    BE BAÑARSE Y CAMBIAR LOS VENDAJES DE LA CIRUGIA:     MEDICAMENTOS/MEDICINAS:  Vuelva a maryanne jimmy medicamentos diarios.  [place] los medicamentos que se le prescribe con un poco de comida.  Si no le prescribe ningún tipo de medicamento, entonces puede maryanne medicinas para el dolor que no contienen aspirina, si las necesita.  LAS MEDICINAS PARA EL DOLOR PUEDEN ESTREÑIRLE MUCHO.  [place] un suavizante para el excremento o materia fecal (stool softener) o un laxativo be por ejemplo: senokot, pericolase, o leche  de magnesia, si lo necesita.    La prescripción la administro .  La ultima sosis de medicina para el dolor fue administrada 1:18pm 10mg Oxycodone .     Se debe hacer pablo consulta medica con el doctor en 7-10 days , Líame para hacer la gerardo.    Usted debe LIAMAR A HARDEN MEDICO si tiene los siguientes síntomas:   -   Pablo fiebre más lina de 101 grados Fahrenheit.   -   Un dolor incesante aún con los medicamentos, o nauseas y vómito persistente.   -   Un sangrado excesivo (sarai que traspasa los vendajes o gasas) o algúln tipo de drenaje inesperado que proviene de la henda.     -   Un color ellington exagerado o hinchazón alrededor del área en donde se le hizo incisión o jacinta, o un drenaje de pus o con olor gretchen proveniente de la henda.   -    La inhabilidad de orinar o vaciar harden vejiga en 8 horas.   -    Problemas con a respiración o beka en el pecho.    Usted debe llamar al 911 si se presentan problemas con el dolor al respirar o el pecho.  Si no se puede ponnoer en comunicación con un medica o con el centro de cirugía, usted debe ir a la estación de emergencia (emergency room) más cercana o a un centro de atención de urgencia (urgent care center).  El teléfono del medico es:     LOS SÍNTOMAS DE UN LEVE RESFRIO SON MUY NORMALES.  ADEMÁS USTED PUEDE LLEGAR A SENTIR BEKA GENERALES DE MÚSCULOS, IRRITACIÓN EN LA GARGANTA, BEKA DE CLARA Y/O UN POCO DE NAUSEAS.    Sie tiene alguna pregunta, llame a harden médico.  Si harden médico no se encuentra disponible, por favor llame al Centro de Cirugía at [phone].  el Centro está abierto de Lunes a Viernes desde las 7:00 de la manana hasta las 5:00 de la noche.      Mi firma a continuación indica que he recibido y entiendco estas instrucciones acera de los cuidados en la casa (Home Care Instructions)    Usted recibirá pablo encuesta en la correspondencia en las siguientes semanas y le pedimos que por favor tome un momento para completar gabbi encuesta y regresaría a hosotros.   Nuestro objetivó es brindarle un cuidado muy ferreira y par lo tanto apreciamos jimmy coméntanos.  Muchas karin por kitty escogido el [facility] de [facility].

## 2025-01-28 NOTE — OR NURSING
1303 Pt to PACU from OR via gurney, respirations spontaneous and non-labored via 6L mask. Left knee surgical sites clean, dry and intact, pt arousable on calling with no complaints of pain or nausea. Ice pack placed on left knee surgical sight, 2+ left pedal pulse noted and cap refill < 2 seconds to left toes.   1315  Pt reports 8/10 pain plan to medicate see MAR.   1325 Report BRITTANY Donald  1400 Pt calm sleeping arouses easily to voice reports tolerable pain. Pt still requiring 2L O2 and sleepy will work on weaning when more awake.   1445 pt still very sleepy arouses to voice reports tolerable pain and denies nausea.   1500  Pt more awakae now sat pt up and educated pt on incentive spirometry, patient able to pull between 3688-4944 mL with a goal of 3250mL. Pt educated on discharge process and POC. Pt report 0/10 pain and denies nausea. Given apple juice per request.   1530 Pt weaned to room air now and maintaining sats will work on getting pt up and testing strength with walker.   1555 Pt able to stand and march with good strength no dizziness or pain. Meets criteria for transfer to stage II.   1600 Report to Kurt SOTO. Pt ambulated to stage II

## 2025-01-28 NOTE — ANESTHESIA TIME REPORT
Anesthesia Start and Stop Event Times       Date Time Event    1/28/2025 1150 Ready for Procedure     1153 Anesthesia Start     1306 Anesthesia Stop          Responsible Staff  01/28/25      Name Role Begin End    Son Mccartney M.D. Anesth 1153 1306          Overtime Reason:  no overtime (within assigned shift)    Comments:

## 2025-01-28 NOTE — ANESTHESIA PROCEDURE NOTES
Airway    Date/Time: 1/28/2025 12:00 PM    Performed by: Son Mccartney M.D.  Authorized by: Son Mccartney M.D.    Location:  OR  Urgency:  Elective  Indications for Airway Management:  Anesthesia      Spontaneous Ventilation: absent    Sedation Level:  Deep  Preoxygenated: Yes    Patient Position:  Sniffing  Final Airway Type:  Endotracheal airway  Final Endotracheal Airway:  ETT  Cuffed: Yes    Technique Used for Successful ETT Placement:  Direct laryngoscopy    Insertion Site:  Oral  Blade Type:  Estrada  Laryngoscope Blade/Videolaryngoscope Blade Size:  2  ETT Size (mm):  8.0  Measured from:  Teeth  ETT to Teeth (cm):  22  Placement Verified by: auscultation and capnometry    Cormack-Lehane Classification:  Grade I - full view of glottis  Number of Attempts at Approach:  1

## 2025-01-28 NOTE — LETTER
January 13, 2025    Patient Name: Ben Mota  Surgeon Name: Kojo Pryor M.D.  Surgery Facility: [facility] ([address] [place])  Surgery Date: 1/28/2025    The time of your surgery is not final and may change up to and until the day of your surgery. You will be contacted 24-48 hours prior to your surgery date with your check-in and surgery time.    If you will not be at one of the below numbers please call the surgery scheduler at [phone]  Preferred Phone: [phone]    BEFORE YOUR SURGERY   Pre Registration and/or Lab Work must be done within and no earlier than 28 days prior to your surgery date. Your scheduled facility will contact you regarding all required preregistration and/or lab work. If you have not been contacted within 7 days of your scheduled procedure please call [facility] at [phone] for an appointment as soon as possible.    DAY OF YOUR SURGERY  Nothing to eat or drink after midnight     Refrain from smoking any substance after midnight prior to surgery. Smoking may interfere with the anesthetic and frequently produces nausea during the recovery period.    Continue taking all lifesaving medications. Including the morning of your surgery with small sip of water.    Please do NOT take on the day of surgery:  Diuretics: examples- furosemide (Lasix), spironolactone, hydrochlorothiazide  ACE-inhibitors: examples- lisinopril, ramipril, enalapril  “ARBs”: examples- losartan, Olmesartan, valsartan    Please arrive at the hospital/surgery center at the check-in time provided.     An adult will need to bring you and take you home after your surgery.     AFTER YOUR SURGERY  Post op Appointment:   Date: 2/13/25   Time: 1 PM   With: Kojo Pryor MD   Location: [address] [place] [address] [place] [id number]        - Therapy- Your first appointment should be 1  week(s) after your surgery. For your convenience we have 4 Physical Therapy  locations: [facility], [place], and [facility]. Call our office ASAP to schedule an appointment at [phone] or take the enclosed Therapy Prescription to a facility of your choice.  - No dental work for 3-6 months after your surgery.  - You must have someone provide transportation post surgery and someone to monitor you for at least 24 hours post-surgery. If you don't have either of these your appointment will be canceled.     TIME OFF WORK  FMLA or Disability forms can be faxed directly to: [phone] or you may drop them off at [address] [place] [address]. Our office charges a $35.00 fee per form. Forms will be completed within 10 business days of drop off and payment received. For the status of your forms you may contact our disability office directly [phone].

## 2025-01-28 NOTE — ANESTHESIA PROCEDURE NOTES
Peripheral Block    Date/Time: 1/28/2025 11:37 AM    Performed by: Son Mccartney M.D.  Authorized by: Son Mccartney M.D.    Start Time:  1/28/2025 11:37 AM  Reason for Block: at surgeon's request and post-op pain management ONLY    patient identified, IV checked, site marked, risks and benefits discussed, surgical consent, monitors and equipment checked, pre-op evaluation and timeout performed    Patient Position:  Supine  Prep: ChloraPrep    Monitoring:  Heart rate, continuous pulse ox and cardiac monitor  Block Region:  Lower Extremity  Lower Extremity - Block Type:  Selective FEMORAL nerve block at the Adductor Canal    Laterality:  Left  Procedures: ultrasound guided  Image captured, interpreted and electronically stored.  Local Infiltration:  Lidocaine  Strength:  1 %  Dose:  3 ml  Block Type:  Single-shot  Needle Length:  100mm  Needle Gauge:  21 G  Needle Localization:  Ultrasound guidance  Ultrasound picture in chart  Injection Assessment:  Negative aspiration for heme, no paresthesia on injection, incremental injection and local visualized surrounding nerve on ultrasound  Evidence of intravascular injection: No     US Guided Selective Femoral Nerve Block at Adductor Canal:   US probe placed at mid-thigh level on externally rotated leg and femur identified.  Probe directed medially until Sartorius Muscle (SM), Femoral Artery (FA) and Saphenous Nerve (SN) identified in Adductor Canal (AC).  Needle inserted anterolateral to probe in an in plane approach into a subsartorial perivascular perineural position.  After negative aspiration LA injected with ease and visualized spreading within the AC.

## 2025-01-28 NOTE — ANESTHESIA PREPROCEDURE EVALUATION
Case: [id number] Date/Time: 01/28/25 1145    Procedure: LEFT TOTAL KNEE ARTHROPLASTY    Diagnosis: Osteoarthritis of left knee [M17.12]    Pre-op diagnosis: Osteoarthritis of left knee [M17.12]    Location:  OR  / SURGERY [facility]    Surgeons: Kojo Pryor M.D.          Bmi 34.9  Htn  Increase cholesterol  Relevant Problems   No relevant active problems       Physical Exam    Anesthesia Plan    ASA 2       Plan - general and peripheral nerve block     Peripheral nerve block will be post-op pain control  Airway plan will be ETT          Induction: intravenous    Postoperative Plan: Postoperative administration of opioids is intended.    Pertinent diagnostic labs and testing reviewed    Informed Consent:    Anesthetic plan and risks discussed with patient.    Use of blood products discussed with: patient whom consented to blood products.

## 2025-01-28 NOTE — OP REPORT
DIAGNOSIS: left knee osteoarthritis.    PROCEDURE: Total knee arthroplasty, Left side.    ANESTHESIA: General.    COMPLICATIONS: None.    SURGEON: Kojo Pryor MD    ASSISTANT: Gabriela Shrestha    INDICATIONS: This is a patient with severe pain secondary to osteoarthritis having failed conservative treatments.    DESCRIPTION OF PROCEDURE: Patient was identified in the preop area, site was   marked, taken back to the operating room and underwent general anesthesia.   The left lower extremity was prepped and draped in sterile manner.   Preoperative timeout was held and antibiotics were given. Incision was made   over the anterior knee, soft tissue was dissected down to the fascia. The   fascia was split in medial parapatellar approach. The findings included severe osteoarthritis.  The step drill was placed,   cut was made at 6 degrees and then a femoral cut guide was placed. All cuts   were made for the 8. The tibia was cut and sized to the 6. The   patella was cut and sized to a 41. All the trials were removed and then the   8 CR J2 S&N femur,  6 tibia and 41 oval patellar button were all cemented into  place. Final trialing showed that a 13 poly provided stability throughout   the entire arc of motion and 13 poly was placed. The wound was soaked with   dilute Betadine solution and was injected with Marcaine. Vicryl was used for   the fascia, Monocryl, soft tissue, skin and Dermabond for the final skin   layer. Patient was woken up, taken back to PACU, will be weightbear as   tolerated.    A walker was provided to assist with ambulation following the above surgery. The patient understands the importance of using the device to safely ambulate until they regain strength and stability.

## 2025-01-29 NOTE — THERAPY
Physical Therapy   Initial Evaluation     Patient Name: Ben Mota  Age:  59 y.o., Sex:  male  Medical Record #: [id number]  Today's Date: 1/28/2025     Precautions  Precautions: Weight Bearing As Tolerated Left Lower Extremity    Assessment  Patient is 59 y.o. male s/p left TKA POD #0.  Pt agreeable to therapy evaluation. Pt is able to ambulate safely with FWW, stair training completed. Pt was provided with education on elevation, icing, positioning, and supine/seated therapeutic exercises. HEP handout issued, pt able to return demo all exercises.  Pt has support at home and has no further acute skilled PT needs at this time. Anticipate pt to d/c home once medically clear with recs for FWW use and OP therapy services.     Plan    Physical Therapy Initial Treatment Plan   Duration: Evaluation only    DC Equipment Recommendations: Front-Wheel Walker  Discharge Recommendations: Recommend outpatient physical therapy services to address higher level deficits        01/28/25 1650   Prior Living Situation   Housing / Facility 1 Story House   Steps Into Home 2   Steps In Home 0   Rail None   Bathroom Set up Walk In Shower   Equipment Owned None   Lives with - Patient's Self Care Capacity Spouse   Comments supportive spouse   Prior Level of Functional Mobility   Bed Mobility Independent   Transfer Status Independent   Ambulation Independent   Assistive Devices Used None   Stairs Independent   Cognition    Level of Consciousness Alert   Comments Pt is Martiniquais speaking, does understand some English. Spouse able to translate   Strength Upper Body   Upper Body Strength  WDL   Passive ROM Lower Body   Passive ROM Lower Body X   Comments L knee 0-90 deg   Active ROM Lower Body    Active ROM Lower Body  X   Comments L knee 0-80 deg   Strength Lower Body   Lower Body Strength  X   Comments L knee limited by pain   Sensation Lower Body   Lower Extremity Sensation   WDL   Balance Assessment   Sitting Balance (Static) Good   Sitting  Balance (Dynamic) Fair +   Standing Balance (Static) Fair +   Standing Balance (Dynamic) Fair   Weight Shift Sitting Good   Weight Shift Standing Fair   Comments stdg with FWW   Bed Mobility    Comments NT, pt sitting up in chair pre and post   Gait Analysis   Gait Level Of Assist Standby Assist   Assistive Device Front Wheel Walker   Distance (Feet) 75   # of Times Distance was Traveled 1   Deviation Decreased Heel Strike;Decreased Toe Off   # of Stairs Climbed 1   Level of Assist with Stairs Contact Guard Assist   Weight Bearing Status WBAT L LE   Functional Mobility   Sit to Stand Standby Assist   Bed, Chair, Wheelchair Transfer Standby Assist   Transfer Method Stand Step

## 2025-01-29 NOTE — ANESTHESIA POSTPROCEDURE EVALUATION
Patient: Ben Mota    Procedure Summary       Date: 01/28/25 Room / Location:  OR 02 / SURGERY [facility]    Anesthesia Start: 1153 Anesthesia Stop: 1306    Procedure: LEFT TOTAL KNEE ARTHROPLASTY (Left: Knee) Diagnosis:       Osteoarthritis of left knee      (Osteoarthritis of left knee [M17.12])    Surgeons: Kooj Pryor M.D. Responsible Provider: Son Mccartney M.D.    Anesthesia Type: general, peripheral nerve block ASA Status: 2            Final Anesthesia Type: general, peripheral nerve block  Last vitals  BP   Blood Pressure: (!) 161/99    Temp   36.5 °C (97.7 °F)    Pulse   (!) 105   Resp   18    SpO2   94 %      Anesthesia Post Evaluation    Patient location during evaluation: PACU  Patient participation: complete - patient participated  Level of consciousness: awake and alert    Airway patency: patent  Anesthetic complications: no  Cardiovascular status: hemodynamically stable  Respiratory status: acceptable  Hydration status: euvolemic    PONV: none          There were no known notable events for this encounter.     Nurse Pain Score: 0 (NPRS)

## 2025-01-29 NOTE — OR NURSING
1600: Patient arrived to phase II from PACU 1 via gurney. Report received from RN. Respirations are spontaneous and unlabored.     1605: Family at bedside.     1715: Patient education completed, family denies further questions. IV removed with tip intact. DC'd to care of responsible adult. Pt discharged with walker.

## (undated) DEVICE — DEVICE SKIN CLOSURE SURGICAL ZIP 24 (5EA/PK)

## (undated) DEVICE — BLADE SAW RECIPROCATING DOUBLE SIDED 70MM X 12.5MM X 0.64MM STERILE (1EA)

## (undated) DEVICE — TIP INTPLS HFLO ML ORFC BTRY - (12/CS) FOR SURGILAV

## (undated) DEVICE — SUCTION INSTRUMENT YANKAUER BULBOUS TIP W/O VENT (50EA/CA)

## (undated) DEVICE — SUTURE 1 VICRYL PLUS CTX - 8 X 18 INCH (12/BX)

## (undated) DEVICE — SODIUM CHL IRRIGATION 0.9% 1000ML (12EA/CA)

## (undated) DEVICE — SUTURE 2-0 MONOCRYL PLUS UNDYED CT-1 1 X 36 (36EA/BX)"

## (undated) DEVICE — DRESSING AQACEL ADVANTAGE ANTIMICROBIAL WITH HYDROFIBER .075IN X 18IN (5EA/BX)

## (undated) DEVICE — SODIUM CHL. IRRIGATION 0.9% 3000ML (4EA/CA 65CA/PF)

## (undated) DEVICE — GLOVE BIOGEL INDICATOR SZ 8 SURGICAL PF LTX - (50/BX 4BX/CA)

## (undated) DEVICE — CONTAINER SPECIMEN BAG OR - STERILE 4 OZ W/LID (100EA/CA)

## (undated) DEVICE — BLADE SAGITTAL SYSTEM 18MM

## (undated) DEVICE — HUMID-VENT HEAT AND MOISTURE EXCHANGE- (50/BX)

## (undated) DEVICE — TUBING CLEARLINK DUO-VENT - C-FLO (48EA/CA)

## (undated) DEVICE — BLADE SAGITTAL DUAL 25MM

## (undated) DEVICE — PAD PREP 24 X 48 CUFFED - (100/CA)

## (undated) DEVICE — GUIDE PIN ORTHOPEDIC GENESIS II STANDARD BCS BICON VEX PATELLAR COMPONENT 29MM (4EA/BX)

## (undated) DEVICE — HANDPIECE 10FT INTPLS SCT PLS IRRIGATION HAND CONTROL SET (6/PK)

## (undated) DEVICE — CHLORAPREP 26 ML APPLICATOR - ORANGE TINT(25/CA)

## (undated) DEVICE — MIXER BONE CEMENT REVOLUTION - W/FEMORAL PRESSURIZER (6/CA)

## (undated) DEVICE — LENS/HOOD FOR SPACESUIT - (32/PK) PEEL AWAY FACE

## (undated) DEVICE — GLOVE BIOGEL SZ 6.5 SURGICAL PF LTX (50PR/BX 4BX/CA)

## (undated) DEVICE — LACTATED RINGERS INJ 1000 ML - (14EA/CA 60CA/PF)

## (undated) DEVICE — TOWEL STOP TIMEOUT SAFETY FLAG (40EA/CA)

## (undated) DEVICE — CANISTER SUCTION RIGID RED 1500CC (40EA/CA)

## (undated) DEVICE — SENSOR OXIMETER ADULT SPO2 RD SET (20EA/BX)

## (undated) DEVICE — PACK TOTAL KNEE (1/CA)

## (undated) DEVICE — GLOVE BIOGEL SZ 8 SURGICAL PF LTX - (50PR/BX 4BX/CA)

## (undated) DEVICE — GLOVE BIOGEL INDICATOR SZ 7SURGICAL PF LTX - (50/BX 4BX/CA)

## (undated) DEVICE — SUTURE GENERAL

## (undated) DEVICE — SET EXTENSION WITH 2 PORTS (48EA/CA) ***PART #2C8610 IS A SUBSTITUTE*****